# Patient Record
Sex: MALE | Race: WHITE | NOT HISPANIC OR LATINO | URBAN - METROPOLITAN AREA
[De-identification: names, ages, dates, MRNs, and addresses within clinical notes are randomized per-mention and may not be internally consistent; named-entity substitution may affect disease eponyms.]

---

## 2023-01-17 ENCOUNTER — TELEPHONE (OUTPATIENT)
Dept: SLEEP MEDICINE | Facility: MEDICAL CENTER | Age: 43
End: 2023-01-17
Payer: COMMERCIAL

## 2023-01-17 NOTE — TELEPHONE ENCOUNTER
SLEEP - NEW PATIENT CHART PREP COMPLETED ON 01/17/2023    SCHEDULED FOLLOW UP 01/18/2023    Ref by Dr. Joiner Dx: Sleep disorder breathing     OPO on Room Air 12/30/2021     No mention of why pt is being referred in referral    Referring OV Notes Reviewed : YES     Is Pt currently on PAP? NO .If yes, contact patient as a remind to bring device with them.     Nocturnal Oxygen :NO . If yes, Liter Flow? N/A    Any prior Sleep Studies on file? NO. If yes, when? N/A    Previously seen with [facility]? NO If yes, when? N/A    Previous [facility] Patient? NO

## 2023-01-18 ENCOUNTER — OFFICE VISIT (OUTPATIENT)
Dept: SLEEP MEDICINE | Facility: MEDICAL CENTER | Age: 43
End: 2023-01-18
Payer: COMMERCIAL

## 2023-01-18 VITALS
DIASTOLIC BLOOD PRESSURE: 90 MMHG | RESPIRATION RATE: 16 BRPM | BODY MASS INDEX: 50.62 KG/M2 | OXYGEN SATURATION: 93 % | WEIGHT: 315 LBS | HEIGHT: 66 IN | SYSTOLIC BLOOD PRESSURE: 156 MMHG | HEART RATE: 75 BPM

## 2023-01-18 DIAGNOSIS — R06.83 SNORING: ICD-10-CM

## 2023-01-18 DIAGNOSIS — E66.01 MORBID OBESITY (HCC): ICD-10-CM

## 2023-01-18 DIAGNOSIS — R53.82 CHRONIC FATIGUE, UNSPECIFIED: ICD-10-CM

## 2023-01-18 DIAGNOSIS — I10 ESSENTIAL HYPERTENSION: ICD-10-CM

## 2023-01-18 DIAGNOSIS — G47.33 OSA TREATED WITH BIPAP: ICD-10-CM

## 2023-01-18 PROCEDURE — 99204 OFFICE O/P NEW MOD 45 MIN: CPT | Performed by: PREVENTIVE MEDICINE

## 2023-01-18 RX ORDER — MELOXICAM 15 MG/1
TABLET ORAL
COMMUNITY

## 2023-01-18 RX ORDER — LISINOPRIL 40 MG/1
TABLET ORAL
COMMUNITY

## 2023-01-18 RX ORDER — METOPROLOL SUCCINATE 100 MG/1
TABLET, EXTENDED RELEASE ORAL
COMMUNITY
Start: 2023-01-11

## 2023-01-18 RX ORDER — AMLODIPINE BESYLATE 10 MG/1
10 TABLET ORAL
COMMUNITY
Start: 2022-12-27

## 2023-01-18 RX ORDER — HYDROCHLOROTHIAZIDE 50 MG/1
TABLET ORAL
COMMUNITY

## 2023-01-18 NOTE — PROGRESS NOTES
CHIEF COMPLIANT: Establish care   HISTORY OF PRESENT ILLNESS:  Elliott Ayala is a 42 y.o.male who is here to establish care.     Sleep study results:  OPO on BiPAP   DATE: 12/30/2021  Analysis 4hrs 4mins. Time spent under 88% is 132mins, oxygen jose 51, and oxygen tracing in study is cw with SANFORD.     Sleep History:  This patient is currently using BiPAP min 9 and max 15.5, He was tested by Dr. Aleman in 2016 and 2017. Those studies are not available. He believes machine is 7-8 yrs old. He reports that he works the [employer] shift from 12am to 8am. He uses zolpidem to sleep. He snores loudly if not using PAP. He works as a  at multiple [employer]. Never smoker and denies alcohol use.     EPWORTH 10/24 is borderline cw EDS     Significant comorbidities and modifying factors: see below     PAST MEDICAL HISTORY:  Past Medical History:   Diagnosis Date    Apnea, sleep     Back pain     Back pain     Chickenpox     Hypertension     Insomnia     Obesity     Snoring     Weight loss       PROBLEM LIST:  There are no problems to display for this patient.    PAST SOCIAL HISTORY:  History reviewed. No pertinent surgical history.  PAST FAMILY HISTORY:  Family History   Problem Relation Age of Onset    Cancer Mother     Alzheimer's Disease Paternal Grandfather      SOCIAL HISTORY:  Social History     Socioeconomic History    Marital status:      Spouse name: Not on file    Number of children: Not on file    Years of education: Not on file    Highest education level: Not on file   Occupational History    Not on file   Tobacco Use    Smoking status: Never    Smokeless tobacco: Never   Vaping Use    Vaping Use: Never used   Substance and Sexual Activity    Alcohol use: Not Currently     Comment: twice a year maybe ( about 4-8 drinks at that time)    Drug use: Never    Sexual activity: Not on file   Other Topics Concern    Not on file   Social History Narrative    Not on file     Social Determinants of Health  "    Financial Resource Strain: Not on file   Food Insecurity: Not on file   Transportation Needs: Not on file   Physical Activity: Not on file   Stress: Not on file   Social Connections: Not on file   Intimate Partner Violence: Not on file   Housing Stability: Not on file     ALLERGIES: Keflex  MEDICATIONS:  Current Outpatient Medications   Medication Sig Dispense Refill    lisinopril (PRINIVIL) 40 MG tablet       amLODIPine (NORVASC) 10 MG Tab Take 10 mg by mouth every day.      metoprolol SR (TOPROL XL) 100 MG TABLET SR 24 HR       meloxicam (MOBIC) 15 MG tablet       hydroCHLOROthiazide (HYDRODIURIL) 50 MG Tab        No current facility-administered medications for this visit.    \"CURRENT RX\"    REVIEW OF SYSTEMS:  Constitutional: Denies weight loss, endorses chronic daytime fatigue++  Eyes: Denies vision changes  Ears/Nose/Mouth/Throat: Denies rhinitis/nasal congestion, injury, decayed teeth/toothaches.  Cardiovascular: Denies chest pain, tightness, palpitations, swelling in legs/feet, difficulty breathing when lying down but gets better when sitting up.   Respiratory: Denies shortness of breath while awake,  Sleep: per HPI  Gastrointestinal: Denies  difficulty swallowing,  heartburn.  Genitourinary: REPORTS nocturia  Musculoskeletal: Denies painful joints, sore muscles, back pain.   Neurological: Denies frequent headaches,weakness, dizziness.    PHYSICAL EXAM/VITALS:  BP (!) 156/90 (BP Location: Left arm, Patient Position: Sitting, BP Cuff Size: Large adult)   Pulse 75   Resp 16   Ht 1.676 m (5' 6\")   Wt (!) 168 kg (371 lb)   SpO2 93%   BMI 59.88 kg/m²   Appearance: Well-nourished, well-developed,  looks stated age, no acute distress  Eyes:   EOMI  ENMT: MASKED  Neck: Supple, trachea midline, neck circumference 22 , Mallampati  4  Respiratory effort:  No intercostal retractions or use of accessory muscles  Musculoskeletal:  Grossly normal; gait and station normal  Neurologic:  oriented to person, time, " place, and purpose; judgement intact  Psychiatric:  No depression, anxiety, agitation      MEDICAL DECISION MAKING:  The medical record was reviewed as it pertains to this referral. This includes records from primary care,consultants notes, referral request, hospital records, labs and imaging. Any available diagnostic and titration nocturnal polysomnograms, home sleep apnea tests, continuous nocturnal oximetry results, multiple sleep latency tests, and recent compliance reports were reviewed with the patient.    ASSESSMENT/PLAN:  Elliott Ayala is a 42 y.o.male who requires an in lab PSG due to severity of symptoms.    1. SANFORD treated with BiPAP  - Polysomnography, 4 or More; Future    2. Chronic fatigue, unspecified  - Polysomnography, 4 or More; Future    3. Essential hypertension  - Polysomnography, 4 or More; Future    4. Snoring  - Polysomnography, 4 or More; Future    5. Morbid obesity (HCC)    Other orders  - lisinopril (PRINIVIL) 40 MG tablet  - amLODIPine (NORVASC) 10 MG Tab; Take 10 mg by mouth every day.  - metoprolol SR (TOPROL XL) 100 MG TABLET SR 24 HR  - meloxicam (MOBIC) 15 MG tablet  - hydroCHLOROthiazide (HYDRODIURIL) 50 MG Tab        The risks of untreated sleep apnea were discussed with the patient at length. Patients with SANFORD are at increased risk of cardiovascular disease including coronary artery disease, systemic arterial hypertension, pulmonary arterial hypertension, cardiac arrhythmias, and stroke. SANFORD patients have an increased risk of motor vehicle accidents, type 2 diabetes, chronic kidney disease, and non-alcoholic liver disease. The patient was advised to avoid driving a motor vehicle when drowsy.  Have advised the patient to follow up with the appropriate healthcare practitioners for all other medical problems and issues.    RETURN TO CLINIC: Return for 3 weeks after ss to discuss results -DR. Manriquez.    My total time spent caring for the patient on the day of the encounter was 40  "minutes. This includes time spent on a thorough chart review including other physician notes, all sleep studies, as well as critical labs and pulmonary and cardiac studies.  Additionally, it includes a thorough discussion of good sleep hygiene and stimulus control, as well as  the need for consistency in terms of sleep preparation and practice.    Please note that this dictation was created using voice recognition software.  I have made every reasonable attempt to correct obvious errors, I expect that there are errors of grammar and possibly content that I did not discover before finalizing this note.Answers submitted by the patient for this visit:  Sleep Center Questionnaire (Submitted on 1/18/2023)  Year of your last physical exam: 2022  Results of exam: Morbid obesity,  Occupation :   Height: 5'6\"  Current weight: 372  6 months ago: 401  At age 20: 280  What is the reason for your visit today?: It has been far to long since last sleep study/ Need new or recalibrated Machine , O2 levels are to low when sleeping  Name of person referring you to the Sleep Center: Dr. José Joiner  Have you ever been hospitalized?: Yes  Reason, year, and hospital in which you were hospitalized:: ER 9/27/22 months past, left flank pain : CT scan ,Urine and blood tested . 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  about the age of 32 I noticed I started to not feel well rested after sleeping.  How does this affect your daily life and activities? Please also rate how serious of a problem this is (1 = Not at all, 10 = Very Serious).: I seem to feel less motivated and energetic then " before 4  Have you had any previous evaluations, examinations, or treatment for this sleep problem or any other problems with your sleep? If so, please describe the evaluation, treatment, and results.: I have a few sleep studies in the past, [id number] . I started using  a bi-pap at that time it seemed to help I lost weight 340 down to 230 but over the last 5 years i have gain the weight back and am at 371 antwon was max at 426 about 2 years ago  Have you used any medications (prescribed or otherwise) to help your sleep problem? If yes, include name, amount, frequency, and the prescribing physician.: a generic Ambien 2016 for about 60 days , requested to Dr. Joiner  to be takin off the medication as it affected my mental capacity  If employed, what time do you usually start and end work?: 12 midnight to 8 am  Do you ever change work shifts? If yes, describe how often (never, infrequently, regularly).: rarely  What time do you usually go to bed and wake up on: Weekdays? Weekends?: 2 pm-9pm ideally but can very  Do you have a regular bed partner?: No  How many minutes does it usually take to fall asleep at night after turning off the lights?: 5 if I am not thinking to much  What do you ordinarily do just prior to turning out the lights and attempting to go to sleep (e.g., reading, TV, baths, etc.)?: read or listen to a book  On average, how many times do you wake up during the night?: 2  On average, how many times do you wake up to use the bathroom?: 2  Do you often wake up too early in the morning and are unable to return to sleep?: 25% of the time  On average, how many hours of sleep do you get per night?: 5  How do you usually awaken?  Alarm, spontaneously, or other?: Alarm on work day or right before alarm  Is it difficult for you to awaken and get out of bed after sleeping? (Not at all, Sometimes, Very): Sometime  Do you nap or return to bed after arising?: 15 % of the time  Are you bothered by sleepiness during  "the day?: No  Do you feel that you get too much sleep at night?: No  Do you feel that you get too little sleep at night?: sometime if I happen to sleep at night  Do you usually feel tired during the day? If so, what do you attribute this to?: sometimes , eating or relaxing on break at work , or lack of sleep  Do you find yourself falling asleep when you don't mean to? : Sometime after eating and or relaxing  If yes, how long does your sleep episode last?: 5 - 45 minutes  Do you feel rested or refreshed after the sleep episode?: No  Have you ever suddenly fallen?: 2 times that I can recall and I was exhausted  Have you ever experienced sudden body weakness?: no  If yes, were you aware of the things around you?: Yes  Was the weakness brought on by any particular event or feeling? If so, briefly describe.: lack of sleep and working too many days or hours in a row  Have you ever experienced weakness or paralysis upon going to sleep?: no  Have you ever experienced weakness or paralysis upon awakening from sleep?: no  Have you ever experienced seeing things or hearing voices/noises: That weren't real? On going to sleep? During the night? On awakening from sleep? During the day?: no  Do you have difficulty breathing at night? If yes, briefly describe.: no  Have you been told you snore while asleep? If so, does it disturb a bed partner (or someone in the same room), or someone in the next room?: yea ,  if I don't sleep with my bi-pap, family  Have you ever experienced doing something without being aware of the action? If yes, please describe.: no  Have you ever experienced upon lying in bed before sleep or on awakening from sleep: Restlessness of legs, \"nervous legs\", \"creeping crawling\" sensation of legs, or twitching of legs?: no  Have you ever been told that your arms or legs jerk or twitch while you are asleep? If yes, how many times per night does this occur?: no  Do you know, or have you ever been told that you do any " of the following while sleeping: talk, walk, grit teeth, wet the bed, wake up screaming or seemingly afraid, have disturbing dreams, have unusual movements, wake up with headaches, (males) have erections? If yes to any of these, please indicate how many times per week, age started, last occurrence, treatment received.: no  Has anyone in your family been known to have any sleep problems? If yes, please list the type of problem (e.g., trouble getting to sleep, too sleepy, bed wetting, etc.), the relationship of this person to you, and the treatment received.: father snored ,

## 2023-03-14 ENCOUNTER — SLEEP STUDY (OUTPATIENT)
Dept: SLEEP MEDICINE | Facility: MEDICAL CENTER | Age: 43
End: 2023-03-14
Attending: PREVENTIVE MEDICINE
Payer: COMMERCIAL

## 2023-03-14 DIAGNOSIS — R06.83 SNORING: ICD-10-CM

## 2023-03-14 DIAGNOSIS — I10 ESSENTIAL HYPERTENSION: ICD-10-CM

## 2023-03-14 DIAGNOSIS — G47.33 OSA TREATED WITH BIPAP: ICD-10-CM

## 2023-03-14 DIAGNOSIS — R53.82 CHRONIC FATIGUE, UNSPECIFIED: ICD-10-CM

## 2023-03-14 PROCEDURE — 95811 POLYSOM 6/>YRS CPAP 4/> PARM: CPT | Performed by: PREVENTIVE MEDICINE

## 2023-03-15 NOTE — PROCEDURES
MONTAGE: Standard  STUDY TYPE: Split Night    RECORDING TECHNIQUE:   After the scalp was prepared, gold plated electrodes were applied to the scalp according to the International 10-20 System. EEG (electroencephalogram) was continuously monitored from the O1-M2, O2-M1, C3-M2, C4-M1, F3-M2, and F4-M1. EOGs (electrooculograms) were monitored by electrodes placed at the left and right outer canthi. Chin EMG (electromyogram) was monitored by electrodes placed on the mentalis and sub-mentalis muscles. Nasal and oral airflow were monitored using a triple port thermocouple as well as oronasal pressure transducer. Respiratory effort was measured by inductive plethysmography technology employing abdominal and thoracic belts. Blood oxygen saturation and pulse were monitored by pulse oximetry. Heart rhythm was monitored by surface electrocardiogram. Leg EMG was studied using surface electrodes placed on left and right anterior tibialis. A microphone was used to monitor tracheal sounds and snoring. Body position was monitored and documented by technician observation.   SCORING CRITERIA:   A modification of the AASM manual for scoring of sleep and associated events was used. Obstructive apneas were scored by cessation of airflow for at least 10 seconds with continuing respiratory effort. Central apneas were scored by cessation of airflow for at least 10 seconds with no respiratory effort. Hypopneas were scored by a 30% or more reduction in airflow for at least 10 seconds accompanied by arterial oxygen desaturation of 3% or an arousal. For CMS (Medicare) patients, per AASM rule 1B, hypopneas are scored by 30% with mild reduction in airflow for at least 10 seconds accompanied by arterial saturation decreased at 4%.    DIAGNOSTIC  Study start time was 08:18:17 PM. Diagnostic recording time was 188 minutes with a total sleep time of 150 minutes resulting in a sleep efficiency of 79.84%%. Sleep latency from the start of the study was  02 minutes and the latency from sleep to REM was 62 minutes. In total,119 arousals were scored for an arousal index of 47.4.  Respiratory:  There were a total of 189 apneas consisting of 181 obstructive apneas, 0 mixed apneas, and 8 central apneas. A total of 122 hypopneas were scored. The apnea index was 75.35 per hour and the hypopnea index was 48.64 per hour resulting in an overall AHI of 123.99. AHI during rem was 93.3 and AHI while supine was 0.00.  Oximetry:  There was a mean oxygen saturation of 86.0%. The minimum oxygen saturation during NREM sleep was62.0% and in REM was 55.0. Time spent during sleep with oxygen saturations <88% was 105.4 minutes.   Cardiac:  The highest heart rate seen while awake was 86 BPM while the highest heart rate during sleep was 81 BPM with an average sleeping heart rate of 56 BPM.  Limb Movements:  There were a total of 81 PLMs during sleep, which resulted in a PLM index of 32.3. There were 12 PLMs associated with arousals which resulted in a PLMS arousal index of 4.8.  TREATMENT:  Treatment recording time was 6h 24.5m (384 minutes) with a total sleep time of 5h 43.0m (343 minutes) resulting in a sleep efficiency of 89.2%. Sleep latency from the start of treatment was 09 minutes and REM latency from sleep onset was 0h 20.5m. The patient had 63 arousals in total for an arousal index of 11.0.  Respiratory:   There were 47 apneas in total consisting of 38 obstructive apneas, 9 central apneas, and 0 mixed apneas for an apnea index of 8.22. The patient had 88 hypopneas in total, which resulted in a hypopnea index of 15.39. The overall AHI was 23.62, with a REM AHI of 29.51, and a supine AHI of 0.00.   Oximetry:  The mean SaO2 during treatment was 91.0%. The minimum oxygen saturation in NREM was78.0 % and in REM was 73.0%. Patient spent 69.4 minutes of TST with SaO2 <88%.  Cardiac:  The highest heart rate during sleep was 73 BPM with an average sleeping heart rate of 54 BPM.  Limb  Movements:  There were a total of 15 PLMS during titration sleep time that resulted in an index of 2.6. There were 14 PLMS associated with arousals. This resulted in a PLM arousal index of 2.4.    Titration:   CPAP was tried from 11 to 15cm H2O, BiPAP was tried from 18/14 to 22/18. This was a fully attended sleep study. This test was technically adequate.     Assessment:   DIAGNOSTIC: Severe obstructive Sleep Apnea Hypopnea - .  Severe nocturnal oxygen desaturation - jose saturation 55% - saturations <88% below for 115.1 minutes of TST.  TREATMENT: Moderate obstructive Sleep Apnea Hypopnea - AHI 23.6 with 10.4% designated as central events.  Moderate to severe nocturnal oxygen desaturation - jose saturation 73% - saturations <88% below for 92.1 minutes of TST.    Recommendation:     This patient will do well on a ResMed auto BiPAP.  This patient did well on several different pressures.  He did best on bilevel of 22/18 with a pressure support of 4.  On this set of pressures his AHI was 0 and his oxygen levels were normalized for sleep.  He will need a careful mask fit and heated humidification.  He will need to meet all insurance requirements for compliance.    HYPNOGRAM:

## 2023-03-23 ENCOUNTER — OFFICE VISIT (OUTPATIENT)
Dept: SLEEP MEDICINE | Facility: MEDICAL CENTER | Age: 43
End: 2023-03-23
Attending: PREVENTIVE MEDICINE
Payer: COMMERCIAL

## 2023-03-23 VITALS
HEART RATE: 75 BPM | RESPIRATION RATE: 16 BRPM | DIASTOLIC BLOOD PRESSURE: 80 MMHG | BODY MASS INDEX: 50.62 KG/M2 | HEIGHT: 66 IN | OXYGEN SATURATION: 92 % | SYSTOLIC BLOOD PRESSURE: 132 MMHG | WEIGHT: 315 LBS

## 2023-03-23 DIAGNOSIS — E66.01 MORBID OBESITY (HCC): ICD-10-CM

## 2023-03-23 DIAGNOSIS — G47.33 OSA TREATED WITH BIPAP: ICD-10-CM

## 2023-03-23 DIAGNOSIS — G47.34 NOCTURNAL OXYGEN DESATURATION: ICD-10-CM

## 2023-03-23 PROCEDURE — 99202 OFFICE O/P NEW SF 15 MIN: CPT | Performed by: PREVENTIVE MEDICINE

## 2023-03-23 PROCEDURE — 99214 OFFICE O/P EST MOD 30 MIN: CPT | Performed by: PREVENTIVE MEDICINE

## 2023-03-23 NOTE — PROGRESS NOTES
"CHIEF COMPLIANT: \"How did I do on my sleep study?\"   LAST SEEN:  Dr. Manriquez on  HISTORY OF PRESENT ILLNESS:  Elliott Ayala is a 42 y.o.male who is here for sleep study results.     Sleep study results:  TYPE: split night   DATE:03/14/2023  Diagnostic:AHI: 124.0  Diagnostic  Oxygen Moisés: 55% with 115 mins at or under 88%   TREATMENT AHI:23.6  TREATMENT Oxygen Moisés: 73% with 92.1mins at or under 88%   TITRATION: 11-15 CPAP ; BiLevel 18/14 up to 22/18    Significant comorbidities and modifying factors: see below     PAST MEDICAL HISTORY:  Past Medical History:   Diagnosis Date    Apnea, sleep     Back pain     Back pain     Chickenpox     Hypertension     Insomnia     Obesity     Snoring     Weight loss       PROBLEM LIST:  There are no problems to display for this patient.    PAST SOCIAL HISTORY:  History reviewed. No pertinent surgical history.  PAST FAMILY HISTORY:  Family History   Problem Relation Age of Onset    Cancer Mother     Alzheimer's Disease Paternal Grandfather      SOCIAL HISTORY:  Social History     Socioeconomic History    Marital status:      Spouse name: Not on file    Number of children: Not on file    Years of education: Not on file    Highest education level: Not on file   Occupational History    Not on file   Tobacco Use    Smoking status: Never    Smokeless tobacco: Never   Vaping Use    Vaping Use: Never used   Substance and Sexual Activity    Alcohol use: Not Currently     Comment: twice a year maybe ( about 4-8 drinks at that time)    Drug use: Never    Sexual activity: Not on file   Other Topics Concern    Not on file   Social History Narrative    Not on file     Social Determinants of Health     Financial Resource Strain: Not on file   Food Insecurity: Not on file   Transportation Needs: Not on file   Physical Activity: Not on file   Stress: Not on file   Social Connections: Not on file   Intimate Partner Violence: Not on file   Housing Stability: Not on file     ALLERGIES: " "Keflex  MEDICATIONS:  Current Outpatient Medications   Medication Sig Dispense Refill    lisinopril (PRINIVIL) 40 MG tablet       amLODIPine (NORVASC) 10 MG Tab Take 10 mg by mouth every day.      metoprolol SR (TOPROL XL) 100 MG TABLET SR 24 HR       meloxicam (MOBIC) 15 MG tablet       hydroCHLOROthiazide (HYDRODIURIL) 50 MG Tab        No current facility-administered medications for this visit.    \"CURRENT RX\"    REVIEW OF SYSTEMS:  Constitutional: Denies weight loss, endorses chronic daytime fatigue  Eyes: Denies vision changes  Ears/Nose/Mouth/Throat: Denies rhinitis/nasal congestion, injury, decayed teeth/toothaches.  Cardiovascular: Denies chest pain, tightness, palpitations, swelling in legs/feet, difficulty breathing when lying down but gets better when sitting up.   Respiratory: Denies shortness of breath while awake,  Sleep: per HPI  Gastrointestinal: Denies  difficulty swallowing,  heartburn.  Genitourinary: REPORTS nocturia  Musculoskeletal: Denies painful joints, sore muscles, back pain.   Neurological: Denies frequent headaches,weakness, dizziness.    PHYSICAL EXAM/VITALS:  /80 (BP Location: Left arm, Patient Position: Sitting, BP Cuff Size: Adult long)   Pulse 75   Resp 16   Ht 1.676 m (5' 6\")   Wt (!) 162 kg (357 lb)   SpO2 92%   BMI 57.62 kg/m²   Appearance: Well-nourished, well-developed,  looks stated age, no acute distress  Eyes:   EOMI  ENMT: MASKED  Neck: Supple, trachea midline  Respiratory effort:  No intercostal retractions or use of accessory muscles  Musculoskeletal:  Grossly normal; gait and station normal  Neurologic:  oriented to person, time, place, and purpose; judgement intact  Psychiatric:  No depression, anxiety, agitation      MEDICAL DECISION MAKING:  The medical record was reviewed as it pertains to this referral. This includes records from primary care,consultants notes, referral request, hospital records, labs and imaging. Any available diagnostic and titration " nocturnal polysomnograms, home sleep apnea tests, continuous nocturnal oximetry results, multiple sleep latency tests, and recent compliance reports were reviewed with the patient.    ASSESSMENT/PLAN:  Elliott Ayala is a 42 y.o.male who has severe obstructive sleep apnea as well as nocturnal hypoxia. However, given the patient's age and insurance he will be started on a Resmed auto BiPAP starting at 21/14 PS 4. After he is compliant on machine, he will need to be retested on an OPO.     1. SANFORD treated with BiPAP  - DME Mask Fitting; Future  - DME BiPAP    2. Nocturnal oxygen desaturation  - DME Mask Fitting; Future  - DME BiPAP    3. Morbid obesity (HCC)  - DME Mask Fitting; Future  - DME BiPAP        MEETING INSURANCE COMPLIANCE REQUIREMENTS and MISC tips:     The patient has 90 days in order to be deemed compliant by the insurance company.  The 90-day starts the day that he receives the machine and supplies from the DME.  It is during this period of time that the patient must demonstrate a consistent use of pap (greater than 4 hours/day for a minimum of 24 days out of 30).  The patient is aware that  PAP usage ( time) will be added up--  together over a 24-hour period.  This simply means that the patient does not have to use the machine for 4 hours in a row and he does not have to be asleep for the minutes to count towards the required 4 hours.     Also  the patient is instructed to keep the machine located slightly ( 6-12 ins)  below the level of the mattress.  This allows for proper humidification of the air before it reaches nasal passages and prevents inhaling water droplets.      Cleaning the mask, headgear, tubing, water reservoir to be done using hot water and a gentle detergent once a week.    The mask can be removed from the headgear and rinsed under hot water daily.  There is no special machine or device that is needed to keep the machine or supplies clean.      The risks of untreated sleep apnea were  discussed with the patient at length. Patients with SANFORD are at increased risk of cardiovascular disease including coronary artery disease, systemic arterial hypertension, pulmonary arterial hypertension, cardiac arrhythmias, and stroke. SANFORD patients have an increased risk of motor vehicle accidents, type 2 diabetes, chronic kidney disease, and non-alcoholic liver disease. The patient was advised to avoid driving a motor vehicle when drowsy.  Have advised the patient to follow up with the appropriate healthcare practitioners for all other medical problems and issues.    RETURN TO CLINIC: Return in about 6 weeks (around 5/4/2023) for Compliance check.    My total time spent caring for the patient on the day of the encounter was 40 minutes. This includes time spent on a thorough chart review including other physician notes, all sleep studies, as well as critical labs and pulmonary and cardiac studies.  Additionally, it includes a thorough discussion of good sleep hygiene and stimulus control, as well as  the need for consistency in terms of sleep preparation and practice.    Please note that this dictation was created using voice recognition software.  I have made every reasonable attempt to correct obvious errors, I expect that there are errors of grammar and possibly content that I did not discover before finalizing this note.

## 2023-05-31 ENCOUNTER — OFFICE VISIT (OUTPATIENT)
Dept: SLEEP MEDICINE | Facility: MEDICAL CENTER | Age: 43
End: 2023-05-31
Attending: PREVENTIVE MEDICINE
Payer: COMMERCIAL

## 2023-05-31 VITALS
OXYGEN SATURATION: 95 % | BODY MASS INDEX: 50.62 KG/M2 | HEIGHT: 66 IN | HEART RATE: 63 BPM | SYSTOLIC BLOOD PRESSURE: 142 MMHG | RESPIRATION RATE: 16 BRPM | DIASTOLIC BLOOD PRESSURE: 76 MMHG | WEIGHT: 315 LBS

## 2023-05-31 DIAGNOSIS — G47.33 OSA ON CPAP: ICD-10-CM

## 2023-05-31 PROCEDURE — 99213 OFFICE O/P EST LOW 20 MIN: CPT | Performed by: PREVENTIVE MEDICINE

## 2023-05-31 PROCEDURE — 3077F SYST BP >= 140 MM HG: CPT | Performed by: PREVENTIVE MEDICINE

## 2023-05-31 PROCEDURE — 99214 OFFICE O/P EST MOD 30 MIN: CPT | Performed by: PREVENTIVE MEDICINE

## 2023-05-31 PROCEDURE — 3078F DIAST BP <80 MM HG: CPT | Performed by: PREVENTIVE MEDICINE

## 2023-05-31 ASSESSMENT — PATIENT HEALTH QUESTIONNAIRE - PHQ9: CLINICAL INTERPRETATION OF PHQ2 SCORE: 0

## 2023-05-31 NOTE — PROGRESS NOTES
CHIEF COMPLAINT: Compliance check/Annual Visit  LAST SEEN: Dr. Manriquez on 3/23/23  HISTORY OF PRESENT ILLNESS:  Elliott Ayala is a 42 y.o.male   who is here today to follow-up  for SANFORD.      COMPLIANCE DATA greater than 4 hours:83%  Machine type: Aircurve 10 V auto  Date range: 05/01/23-05/30/23  AHI: 0.8  TIME USED: 6hrs 27mins   PRESSURE SETTINGS: IPAP 21 EPAP 15 PS 4  LEAK:high 56.9 L/min   Patient has not changed mask cushion.     This patient is using PAP therapy consistently and is benefiting from it ..He has more energy and  better sleep.    The patient reports improved symptoms using positive airway pressure.     Significant comorbidities and modifying factors: see below    PAST MEDICAL HISTORY:  Past Medical History:   Diagnosis Date    Apnea, sleep     Back pain     Back pain     Chickenpox     Hypertension     Insomnia     Obesity     Snoring     Weight loss       PROBLEM LIST:  There are no problems to display for this patient.    PAST SOCIAL HISTORY:  History reviewed. No pertinent surgical history.  PAST FAMILY HISTORY:  Family History   Problem Relation Age of Onset    Cancer Mother     Alzheimer's Disease Paternal Grandfather      SOCIAL HISTORY:  Social History     Socioeconomic History    Marital status:      Spouse name: Not on file    Number of children: Not on file    Years of education: Not on file    Highest education level: Not on file   Occupational History    Not on file   Tobacco Use    Smoking status: Never    Smokeless tobacco: Never   Vaping Use    Vaping Use: Never used   Substance and Sexual Activity    Alcohol use: Not Currently     Comment: twice a year maybe ( about 4-8 drinks at that time)    Drug use: Never    Sexual activity: Not on file   Other Topics Concern    Not on file   Social History Narrative    Not on file     Social Determinants of Health     Financial Resource Strain: Not on file   Food Insecurity: Not on file   Transportation Needs: Not on file   Physical  "Activity: Not on file   Stress: Not on file   Social Connections: Not on file   Intimate Partner Violence: Not on file   Housing Stability: Not on file     ALLERGIES: Keflex  MEDICATIONS:  Current Outpatient Medications   Medication Sig Dispense Refill    lisinopril (PRINIVIL) 40 MG tablet       amLODIPine (NORVASC) 10 MG Tab Take 10 mg by mouth every day.      metoprolol SR (TOPROL XL) 100 MG TABLET SR 24 HR       meloxicam (MOBIC) 15 MG tablet       hydroCHLOROthiazide (HYDRODIURIL) 50 MG Tab        No current facility-administered medications for this visit.    \"CURRENT RX\"    REVIEW OF SYSTEMS:  SEE HPI      PHYSICAL EXAM/VITALS:  BP (!) 142/76 (BP Location: Left arm, Patient Position: Sitting, BP Cuff Size: Large adult)   Pulse 63   Resp 16   Ht 1.676 m (5' 6\")   Wt (!) 168 kg (371 lb)   SpO2 95%   BMI 59.88 kg/m²   Appearance: Well-nourished, well-developed,  looks stated age, no acute distress  Eyes:   EOMI  ENMT: MASKED   Neck: Supple, trachea midline  Respiratory effort:  No intercostal retractions or use of accessory muscles  Musculoskeletal:  Grossly normal; gait and station normal  Neurologic:  oriented to person, time, place, and purpose; judgement intact  Psychiatric:  No depression, anxiety, agitation    MEDICAL DECISION MAKING:  The medical record was reviewed as it pertains to this referral. This includes records from primary care,consultants notes, referral request, hospital records, labs and imaging. Any available diagnostic and titration nocturnal polysomnograms, home sleep apnea tests, continuous nocturnal oximetry results, multiple sleep latency tests, and recent compliance reports were reviewed with the patient.    ASSESSMENT/PLAN:  Elliott Ayala is a 42 y.o.male who is doing well on PAP therapy and who has met all compliance requirements for insurance purposes.       1. SANFORD on CPAP  - DME Mask and Supplies        The risks of untreated sleep apnea were discussed with the patient at " length. Patients with SANFORD are at increased risk of cardiovascular disease including coronary artery disease, systemic arterial hypertension, pulmonary arterial hypertension, cardiac arrhythmias, and stroke. SANFORD patients have an increased risk of motor vehicle accidents, type 2 diabetes, chronic kidney disease, and non-alcoholic liver disease. The patient was advised to avoid driving a motor vehicle when drowsy.  Have advised the patient to follow up with the appropriate healthcare practitioners for all other medical problems and issues.    RETURN TO CLINIC: Return in about 1 year (around 5/31/2024) for Annual visit.    My total time spent caring for the patient on the day of the encounter was 40 minutes. This includes time spent on a thorough chart review including other physician notes, all sleep studies, as well as critical labs and pulmonary and cardiac studies.  Additionally, it includes a thorough discussion of good sleep hygiene and stimulus control, as well as  the need for consistency in terms of sleep preparation and practice.    Please note that this dictation was created using voice recognition software.  I have made every reasonable attempt to correct obvious errors, I expect that there are errors of grammar and possibly content that I did not discover before finalizing this note.